# Patient Record
Sex: FEMALE | Race: WHITE | NOT HISPANIC OR LATINO
[De-identification: names, ages, dates, MRNs, and addresses within clinical notes are randomized per-mention and may not be internally consistent; named-entity substitution may affect disease eponyms.]

---

## 2017-03-01 ENCOUNTER — APPOINTMENT (OUTPATIENT)
Dept: OTOLARYNGOLOGY | Facility: CLINIC | Age: 62
End: 2017-03-01

## 2017-03-01 VITALS — HEIGHT: 63 IN

## 2017-03-01 VITALS
TEMPERATURE: 98.7 F | DIASTOLIC BLOOD PRESSURE: 73 MMHG | SYSTOLIC BLOOD PRESSURE: 107 MMHG | HEART RATE: 84 BPM | OXYGEN SATURATION: 97 %

## 2017-03-27 ENCOUNTER — APPOINTMENT (OUTPATIENT)
Dept: OTOLARYNGOLOGY | Facility: CLINIC | Age: 62
End: 2017-03-27

## 2017-03-27 VITALS
TEMPERATURE: 97.4 F | HEART RATE: 85 BPM | DIASTOLIC BLOOD PRESSURE: 71 MMHG | SYSTOLIC BLOOD PRESSURE: 104 MMHG | OXYGEN SATURATION: 100 %

## 2017-03-27 DIAGNOSIS — J32.2 CHRONIC ETHMOIDAL SINUSITIS: ICD-10-CM

## 2017-03-27 DIAGNOSIS — R51 HEADACHE: ICD-10-CM

## 2017-03-27 DIAGNOSIS — Z87.09 PERSONAL HISTORY OF OTHER DISEASES OF THE RESPIRATORY SYSTEM: ICD-10-CM

## 2017-03-27 RX ORDER — CIPROFLOXACIN HYDROCHLORIDE 500 MG/1
500 TABLET, FILM COATED ORAL TWICE DAILY
Qty: 14 | Refills: 0 | Status: COMPLETED | COMMUNITY
Start: 2017-03-01 | End: 2017-03-27

## 2017-03-28 ENCOUNTER — APPOINTMENT (OUTPATIENT)
Dept: OTOLARYNGOLOGY | Facility: CLINIC | Age: 62
End: 2017-03-28

## 2017-04-03 LAB — BACTERIA FLD CULT: ABNORMAL

## 2017-05-16 ENCOUNTER — APPOINTMENT (OUTPATIENT)
Dept: OTOLARYNGOLOGY | Facility: CLINIC | Age: 62
End: 2017-05-16

## 2017-05-16 VITALS
HEART RATE: 83 BPM | TEMPERATURE: 98.4 F | SYSTOLIC BLOOD PRESSURE: 98 MMHG | DIASTOLIC BLOOD PRESSURE: 62 MMHG | OXYGEN SATURATION: 100 %

## 2017-05-19 LAB — BACTERIA NOSE AEROBE CULT: NORMAL

## 2017-06-05 RX ORDER — AMOXICILLIN AND CLAVULANATE POTASSIUM 875; 125 MG/1; MG/1
875-125 TABLET, COATED ORAL TWICE DAILY
Qty: 14 | Refills: 0 | Status: COMPLETED | COMMUNITY
Start: 2017-03-27 | End: 2017-06-05

## 2017-06-06 ENCOUNTER — APPOINTMENT (OUTPATIENT)
Dept: OTOLARYNGOLOGY | Facility: CLINIC | Age: 62
End: 2017-06-06

## 2017-06-06 VITALS
SYSTOLIC BLOOD PRESSURE: 88 MMHG | OXYGEN SATURATION: 98 % | DIASTOLIC BLOOD PRESSURE: 61 MMHG | HEART RATE: 70 BPM | TEMPERATURE: 98.4 F

## 2017-06-06 DIAGNOSIS — R05 COUGH: ICD-10-CM

## 2017-06-06 DIAGNOSIS — J30.9 ALLERGIC RHINITIS, UNSPECIFIED: ICD-10-CM

## 2017-06-06 DIAGNOSIS — J03.90 ACUTE TONSILLITIS, UNSPECIFIED: ICD-10-CM

## 2017-08-07 ENCOUNTER — APPOINTMENT (OUTPATIENT)
Dept: OTOLARYNGOLOGY | Facility: CLINIC | Age: 62
End: 2017-08-07
Payer: COMMERCIAL

## 2017-08-07 VITALS — HEART RATE: 81 BPM | OXYGEN SATURATION: 99 % | SYSTOLIC BLOOD PRESSURE: 100 MMHG | DIASTOLIC BLOOD PRESSURE: 67 MMHG

## 2017-08-07 PROCEDURE — 99213 OFFICE O/P EST LOW 20 MIN: CPT

## 2018-10-10 ENCOUNTER — APPOINTMENT (OUTPATIENT)
Dept: ORTHOPEDIC SURGERY | Facility: CLINIC | Age: 63
End: 2018-10-10
Payer: COMMERCIAL

## 2018-10-10 DIAGNOSIS — M25.532 PAIN IN LEFT WRIST: ICD-10-CM

## 2018-10-10 DIAGNOSIS — Z87.19 PERSONAL HISTORY OF OTHER DISEASES OF THE DIGESTIVE SYSTEM: ICD-10-CM

## 2018-10-10 DIAGNOSIS — Z87.39 PERSONAL HISTORY OF OTHER DISEASES OF THE MUSCULOSKELETAL SYSTEM AND CONNECTIVE TISSUE: ICD-10-CM

## 2018-10-10 DIAGNOSIS — M25.531 PAIN IN RIGHT WRIST: ICD-10-CM

## 2018-10-10 DIAGNOSIS — R22.31 LOCALIZED SWELLING, MASS AND LUMP, RIGHT UPPER LIMB: ICD-10-CM

## 2018-10-10 PROCEDURE — 76882 US LMTD JT/FCL EVL NVASC XTR: CPT | Mod: RT

## 2018-10-10 PROCEDURE — 99203 OFFICE O/P NEW LOW 30 MIN: CPT

## 2018-10-10 PROCEDURE — 73110 X-RAY EXAM OF WRIST: CPT | Mod: 50

## 2018-10-10 RX ORDER — CHROMIUM 200 MCG
TABLET ORAL
Refills: 0 | Status: ACTIVE | COMMUNITY

## 2018-10-18 ENCOUNTER — MOBILE ON CALL (OUTPATIENT)
Age: 63
End: 2018-10-18

## 2018-10-18 ENCOUNTER — OTHER (OUTPATIENT)
Age: 63
End: 2018-10-18

## 2018-10-19 ENCOUNTER — RESULT REVIEW (OUTPATIENT)
Age: 63
End: 2018-10-19

## 2018-10-22 ENCOUNTER — MOBILE ON CALL (OUTPATIENT)
Age: 63
End: 2018-10-22

## 2020-12-15 PROBLEM — Z87.09 HISTORY OF PHARYNGITIS: Status: RESOLVED | Noted: 2017-03-01 | Resolved: 2020-12-15

## 2021-09-03 ENCOUNTER — APPOINTMENT (OUTPATIENT)
Dept: ORTHOPEDIC SURGERY | Facility: CLINIC | Age: 66
End: 2021-09-03
Payer: COMMERCIAL

## 2021-09-03 VITALS — WEIGHT: 115 LBS | HEIGHT: 63 IN | RESPIRATION RATE: 16 BRPM | BODY MASS INDEX: 20.38 KG/M2

## 2021-09-03 DIAGNOSIS — M19.032 PRIMARY OSTEOARTHRITIS, LEFT WRIST: ICD-10-CM

## 2021-09-03 PROCEDURE — 99214 OFFICE O/P EST MOD 30 MIN: CPT | Mod: 25

## 2021-09-03 PROCEDURE — 20605 DRAIN/INJ JOINT/BURSA W/O US: CPT | Mod: LT

## 2021-09-19 ENCOUNTER — TRANSCRIPTION ENCOUNTER (OUTPATIENT)
Age: 66
End: 2021-09-19

## 2023-12-13 ENCOUNTER — APPOINTMENT (OUTPATIENT)
Dept: HEART AND VASCULAR | Facility: CLINIC | Age: 68
End: 2023-12-13
Payer: MEDICARE

## 2023-12-13 VITALS
DIASTOLIC BLOOD PRESSURE: 80 MMHG | SYSTOLIC BLOOD PRESSURE: 110 MMHG | OXYGEN SATURATION: 96 % | BODY MASS INDEX: 21.19 KG/M2 | TEMPERATURE: 97.5 F | HEIGHT: 63 IN | WEIGHT: 119.6 LBS | HEART RATE: 85 BPM

## 2023-12-13 PROCEDURE — 99204 OFFICE O/P NEW MOD 45 MIN: CPT

## 2023-12-13 PROCEDURE — 93000 ELECTROCARDIOGRAM COMPLETE: CPT

## 2023-12-13 PROCEDURE — 93306 TTE W/DOPPLER COMPLETE: CPT

## 2023-12-13 RX ORDER — ASPIRIN 81 MG
81 TABLET,CHEWABLE ORAL
Refills: 0 | Status: ACTIVE | COMMUNITY

## 2023-12-13 RX ORDER — ALENDRONATE SODIUM 70 MG/1
70 TABLET ORAL
Refills: 0 | Status: ACTIVE | COMMUNITY

## 2023-12-13 NOTE — HISTORY OF PRESENT ILLNESS
[FreeTextEntry1] : 68 F near syncope   - HTN, - DM, - lipids, - tobacco, - family history premature CAD.   ankle fracture, while in [place], flew home,  then DVT. Rx xarelto x months, then asprin for life. Father LE DVT (age 85) while hospitalized.  IVC filter.  Denies CP, SOB, orthopnea, palpitations, edema.  No limitations w  exercise.  Travel to [place] every two weeks.  While seated at dinner, lightheaded, diaphoretic. No LOC. BP 80s. Lasted 15 min, resolved spontaneously. No recurrence   In retrospect, also transient short live dizziness two mos ago, also resolved spontaneously

## 2023-12-13 NOTE — ASSESSMENT
[FreeTextEntry1] : EKHG 12/7/23 NSR low voltage   lipids TCHOL 248  HDL 83 TG 70  A/P  1. near syncope Hx as above echo (abnormal EKG as below) zio (will defer until after wrist surgery == arthritis, not carpal tunnel)    2. abnormal EKG echo   3. hyperlipidemia  in view of LDL will obtain cor calcium score If elevated consider NMR LipoPorfile as well to stratify HDL particle   ADDEDNUM Coronary caclium score-0 no further intervention for now

## 2024-01-08 ENCOUNTER — APPOINTMENT (OUTPATIENT)
Dept: HEART AND VASCULAR | Facility: CLINIC | Age: 69
End: 2024-01-08
Payer: MEDICARE

## 2024-01-08 VITALS
OXYGEN SATURATION: 99 % | HEIGHT: 63 IN | WEIGHT: 119 LBS | TEMPERATURE: 97.6 F | DIASTOLIC BLOOD PRESSURE: 70 MMHG | HEART RATE: 72 BPM | BODY MASS INDEX: 21.09 KG/M2 | SYSTOLIC BLOOD PRESSURE: 109 MMHG

## 2024-01-08 DIAGNOSIS — E78.5 HYPERLIPIDEMIA, UNSPECIFIED: ICD-10-CM

## 2024-01-08 PROCEDURE — 99214 OFFICE O/P EST MOD 30 MIN: CPT

## 2024-01-08 PROCEDURE — G2211 COMPLEX E/M VISIT ADD ON: CPT

## 2024-01-22 ENCOUNTER — APPOINTMENT (OUTPATIENT)
Dept: HEART AND VASCULAR | Facility: CLINIC | Age: 69
End: 2024-01-22

## 2024-02-05 ENCOUNTER — APPOINTMENT (OUTPATIENT)
Dept: HEART AND VASCULAR | Facility: CLINIC | Age: 69
End: 2024-02-05
Payer: MEDICARE

## 2024-02-05 PROCEDURE — 93228 REMOTE 30 DAY ECG REV/REPORT: CPT

## 2024-02-09 ENCOUNTER — APPOINTMENT (OUTPATIENT)
Dept: HEART AND VASCULAR | Facility: CLINIC | Age: 69
End: 2024-02-09

## 2024-02-10 PROBLEM — E78.5 HYPERLIPIDEMIA LDL GOAL <70: Status: ACTIVE | Noted: 2023-12-13

## 2024-02-10 NOTE — ASSESSMENT
[FreeTextEntry1] : echo  CONCLUSIONS:  1. Left ventricular cavity is normal. Left ventricular systolic function is normal with an ejection fraction of 61 % by Benavides's method of disks. 2. Normal right ventricular cavity size and systolic function. 3. Trace to mild mitral regurgitation. 4. Mild to moderate tricuspid regurgitation. 5. Trace pulmonic regurgitation. 6. No pericardial effusion seen.A/P  1. near syncope Hx as above echo (abnormal EKG as below) zio (will defer until after wrist surgery == arthritis, not carpal tunnel)   2. abnormal EKG echo as above   3. hyperlipidemia in view of LDL will obtain cor calcium score Coronary calcium score-0 Follow for now

## 2024-02-10 NOTE — HISTORY OF PRESENT ILLNESS
[FreeTextEntry1] : 68 F f/u for near syncope  - HTN, - DM, - lipids, - tobacco, - family history premature CAD.  ankle fracture, while in [place], flew home, then DVT. Rx xarelto x months, then asprin for life. Father LE DVT (age 85) while hospitalized. IVC filter.  Denies CP, SOB, orthopnea, palpitations, edema. No limitations w exercise.  Travel to [place] every two weeks. While seated at dinner, lightheaded, diaphoretic. No LOC. BP 80s. Lasted 15 min, resolved spontaneously. No recurrence  In retrospect, also transient short lived dizziness two mos ago, also resolved spontaneously

## 2024-04-12 ENCOUNTER — APPOINTMENT (OUTPATIENT)
Dept: HEART AND VASCULAR | Facility: CLINIC | Age: 69
End: 2024-04-12
Payer: MEDICARE

## 2024-04-12 DIAGNOSIS — R55 SYNCOPE AND COLLAPSE: ICD-10-CM

## 2024-04-12 PROCEDURE — 99442: CPT | Mod: 93

## 2024-04-12 PROCEDURE — G2211 COMPLEX E/M VISIT ADD ON: CPT | Mod: NC

## 2024-05-24 ENCOUNTER — NON-APPOINTMENT (OUTPATIENT)
Age: 69
End: 2024-05-24

## 2024-05-24 ENCOUNTER — APPOINTMENT (OUTPATIENT)
Dept: HEART AND VASCULAR | Facility: CLINIC | Age: 69
End: 2024-05-24
Payer: MEDICARE

## 2024-05-24 VITALS
WEIGHT: 116 LBS | SYSTOLIC BLOOD PRESSURE: 107 MMHG | HEART RATE: 80 BPM | DIASTOLIC BLOOD PRESSURE: 70 MMHG | HEIGHT: 63 IN | BODY MASS INDEX: 20.55 KG/M2 | TEMPERATURE: 98.2 F | OXYGEN SATURATION: 99 %

## 2024-05-24 DIAGNOSIS — R94.31 ABNORMAL ELECTROCARDIOGRAM [ECG] [EKG]: ICD-10-CM

## 2024-05-24 DIAGNOSIS — R00.2 PALPITATIONS: ICD-10-CM

## 2024-05-24 PROCEDURE — 93000 ELECTROCARDIOGRAM COMPLETE: CPT

## 2024-05-24 PROCEDURE — G2211 COMPLEX E/M VISIT ADD ON: CPT

## 2024-05-24 PROCEDURE — 99214 OFFICE O/P EST MOD 30 MIN: CPT

## 2024-05-24 RX ORDER — MINOXIDIL 2.5 MG/1
2.5 TABLET ORAL
Refills: 0 | Status: ACTIVE | COMMUNITY

## 2024-07-02 PROBLEM — R94.31 ABNORMAL EKG: Status: ACTIVE | Noted: 2023-12-13

## 2024-07-02 PROBLEM — R00.2 PALPITATIONS: Status: ACTIVE | Noted: 2024-07-02

## 2024-07-02 LAB — DEPRECATED D DIMER PPP IA-ACNC: <150 NG/ML DDU

## 2024-11-11 ENCOUNTER — NON-APPOINTMENT (OUTPATIENT)
Age: 69
End: 2024-11-11

## 2024-11-11 ENCOUNTER — APPOINTMENT (OUTPATIENT)
Dept: HEART AND VASCULAR | Facility: CLINIC | Age: 69
End: 2024-11-11
Payer: MEDICARE

## 2024-11-11 VITALS
HEART RATE: 75 BPM | OXYGEN SATURATION: 99 % | HEIGHT: 63 IN | SYSTOLIC BLOOD PRESSURE: 107 MMHG | BODY MASS INDEX: 20.55 KG/M2 | DIASTOLIC BLOOD PRESSURE: 62 MMHG | WEIGHT: 116 LBS | TEMPERATURE: 98.4 F

## 2024-11-11 DIAGNOSIS — R00.2 PALPITATIONS: ICD-10-CM

## 2024-11-11 DIAGNOSIS — E78.5 HYPERLIPIDEMIA, UNSPECIFIED: ICD-10-CM

## 2024-11-11 PROCEDURE — 93000 ELECTROCARDIOGRAM COMPLETE: CPT

## 2024-11-11 PROCEDURE — 99213 OFFICE O/P EST LOW 20 MIN: CPT

## 2024-11-11 RX ORDER — METOPROLOL TARTRATE 25 MG/1
25 TABLET ORAL
Qty: 30 | Refills: 0 | Status: ACTIVE | COMMUNITY
Start: 2024-11-11 | End: 1900-01-01

## 2025-03-12 ENCOUNTER — RX RENEWAL (OUTPATIENT)
Age: 70
End: 2025-03-12